# Patient Record
Sex: MALE | Race: WHITE | NOT HISPANIC OR LATINO | Employment: UNEMPLOYED | URBAN - METROPOLITAN AREA
[De-identification: names, ages, dates, MRNs, and addresses within clinical notes are randomized per-mention and may not be internally consistent; named-entity substitution may affect disease eponyms.]

---

## 2017-08-07 ENCOUNTER — NON-PROVIDER VISIT (OUTPATIENT)
Dept: OCCUPATIONAL MEDICINE | Facility: CLINIC | Age: 22
End: 2017-08-07

## 2017-08-07 DIAGNOSIS — Z02.1 ENCOUNTER FOR PRE-EMPLOYMENT DRUG TESTING: ICD-10-CM

## 2017-08-07 PROCEDURE — 8898 PR URINE 11 PANEL - SEND TO LAB: Performed by: PREVENTIVE MEDICINE

## 2018-03-22 ENCOUNTER — NON-PROVIDER VISIT (OUTPATIENT)
Dept: URGENT CARE | Facility: PHYSICIAN GROUP | Age: 23
End: 2018-03-22

## 2018-03-22 DIAGNOSIS — Z02.1 PRE-EMPLOYMENT DRUG SCREENING: ICD-10-CM

## 2018-03-22 LAB
AMP AMPHETAMINE: NORMAL
COC COCAINE: NORMAL
INT CON NEG: NORMAL
INT CON POS: NORMAL
MET METHAMPHETAMINES: NORMAL
OPI OPIATES: NORMAL
PCP PHENCYCLIDINE: NORMAL
POC DRUG COMMENT 753798-OCCUPATIONAL HEALTH: NEGATIVE
THC: NORMAL

## 2018-03-22 PROCEDURE — 80305 DRUG TEST PRSMV DIR OPT OBS: CPT | Performed by: PHYSICIAN ASSISTANT

## 2018-06-20 ENCOUNTER — OCCUPATIONAL MEDICINE (OUTPATIENT)
Dept: URGENT CARE | Facility: PHYSICIAN GROUP | Age: 23
End: 2018-06-20

## 2018-06-20 VITALS
HEART RATE: 84 BPM | DIASTOLIC BLOOD PRESSURE: 64 MMHG | OXYGEN SATURATION: 95 % | RESPIRATION RATE: 16 BRPM | BODY MASS INDEX: 20.66 KG/M2 | SYSTOLIC BLOOD PRESSURE: 110 MMHG | HEIGHT: 74 IN | TEMPERATURE: 97.4 F | WEIGHT: 161 LBS

## 2018-06-20 DIAGNOSIS — Z02.1 PRE-EMPLOYMENT EXAMINATION: ICD-10-CM

## 2018-06-20 PROCEDURE — 8915 PR COMPREHENSIVE PHYSICAL: Performed by: PHYSICIAN ASSISTANT

## 2018-06-20 NOTE — PROGRESS NOTES
Patient comes in for a preemployment physical. Patient is on Suboxone 3 mg daily, down from 24 mg daily one year ago. This does not appear to affect him from a safety standpoint. All paperwork reviewed. Exam normal. Cleared for work.

## 2020-06-19 ENCOUNTER — OFFICE VISIT (OUTPATIENT)
Dept: URGENT CARE | Facility: PHYSICIAN GROUP | Age: 25
End: 2020-06-19
Payer: MEDICAID

## 2020-06-19 ENCOUNTER — HOSPITAL ENCOUNTER (EMERGENCY)
Facility: MEDICAL CENTER | Age: 25
End: 2020-06-19
Attending: EMERGENCY MEDICINE
Payer: MEDICAID

## 2020-06-19 VITALS
HEIGHT: 74 IN | OXYGEN SATURATION: 100 % | SYSTOLIC BLOOD PRESSURE: 126 MMHG | BODY MASS INDEX: 20.79 KG/M2 | DIASTOLIC BLOOD PRESSURE: 80 MMHG | HEART RATE: 101 BPM | WEIGHT: 162 LBS | RESPIRATION RATE: 18 BRPM | TEMPERATURE: 98.9 F

## 2020-06-19 VITALS
SYSTOLIC BLOOD PRESSURE: 126 MMHG | HEIGHT: 74 IN | WEIGHT: 163.36 LBS | DIASTOLIC BLOOD PRESSURE: 76 MMHG | OXYGEN SATURATION: 96 % | RESPIRATION RATE: 16 BRPM | TEMPERATURE: 97 F | BODY MASS INDEX: 20.97 KG/M2 | HEART RATE: 108 BPM

## 2020-06-19 DIAGNOSIS — L03.032 PARONYCHIA OF TOE OF LEFT FOOT: ICD-10-CM

## 2020-06-19 DIAGNOSIS — L03.032 CELLULITIS OF GREAT TOE OF LEFT FOOT: ICD-10-CM

## 2020-06-19 DIAGNOSIS — L03.032 PARONYCHIA OF GREAT TOE, LEFT: ICD-10-CM

## 2020-06-19 PROCEDURE — 303977 HCHG I & D

## 2020-06-19 PROCEDURE — 700111 HCHG RX REV CODE 636 W/ 250 OVERRIDE (IP): Performed by: EMERGENCY MEDICINE

## 2020-06-19 PROCEDURE — 90471 IMMUNIZATION ADMIN: CPT

## 2020-06-19 PROCEDURE — 90715 TDAP VACCINE 7 YRS/> IM: CPT | Performed by: EMERGENCY MEDICINE

## 2020-06-19 PROCEDURE — 10060 I&D ABSCESS SIMPLE/SINGLE: CPT | Performed by: NURSE PRACTITIONER

## 2020-06-19 PROCEDURE — 700101 HCHG RX REV CODE 250

## 2020-06-19 PROCEDURE — 99281 EMR DPT VST MAYX REQ PHY/QHP: CPT | Mod: 25

## 2020-06-19 RX ORDER — LIDOCAINE HYDROCHLORIDE 10 MG/ML
INJECTION, SOLUTION INFILTRATION; PERINEURAL
Status: COMPLETED
Start: 2020-06-19 | End: 2020-06-19

## 2020-06-19 RX ORDER — LIDOCAINE HYDROCHLORIDE 10 MG/ML
20 INJECTION, SOLUTION INFILTRATION; PERINEURAL ONCE
Status: COMPLETED | OUTPATIENT
Start: 2020-06-19 | End: 2020-06-19

## 2020-06-19 RX ORDER — SULFAMETHOXAZOLE AND TRIMETHOPRIM 800; 160 MG/1; MG/1
1 TABLET ORAL 2 TIMES DAILY
Qty: 20 TAB | Refills: 0 | Status: SHIPPED | OUTPATIENT
Start: 2020-06-19 | End: 2020-06-29

## 2020-06-19 RX ADMIN — CLOSTRIDIUM TETANI TOXOID ANTIGEN (FORMALDEHYDE INACTIVATED), CORYNEBACTERIUM DIPHTHERIAE TOXOID ANTIGEN (FORMALDEHYDE INACTIVATED), BORDETELLA PERTUSSIS TOXOID ANTIGEN (GLUTARALDEHYDE INACTIVATED), BORDETELLA PERTUSSIS FILAMENTOUS HEMAGGLUTININ ANTIGEN (FORMALDEHYDE INACTIVATED), BORDETELLA PERTUSSIS PERTACTIN ANTIGEN, AND BORDETELLA PERTUSSIS FIMBRIAE 2/3 ANTIGEN 0.5 ML: 5; 2; 2.5; 5; 3; 5 INJECTION, SUSPENSION INTRAMUSCULAR at 20:58

## 2020-06-19 RX ADMIN — LIDOCAINE HYDROCHLORIDE 20 ML: 10 INJECTION, SOLUTION INFILTRATION; PERINEURAL at 21:15

## 2020-06-19 ASSESSMENT — ENCOUNTER SYMPTOMS
ABDOMINAL PAIN: 0
MYALGIAS: 0
FEVER: 0
JOINT SWELLING: 1
CHILLS: 0
DIZZINESS: 0
SORE THROAT: 0
SWOLLEN GLANDS: 0
NAUSEA: 0
VOMITING: 0
COUGH: 0
EYE PAIN: 0
SHORTNESS OF BREATH: 0

## 2020-06-19 NOTE — PROGRESS NOTES
"Subjective:   Young Myers  is a 24 y.o. male who presents for Toe Pain (poss infected, severe pain )        Nail Problem   This is a new problem. Episode onset: 3 days; left great toenail draining, swelling red sevverely painful. The problem occurs constantly. The problem has been rapidly worsening. Associated symptoms include joint swelling. Pertinent negatives include no abdominal pain, chest pain, chills, coughing, fever, myalgias, nausea, rash, sore throat, swollen glands or vomiting. The symptoms are aggravated by walking. He has tried nothing for the symptoms. The treatment provided no relief.     Review of Systems   Constitutional: Negative for chills and fever.   HENT: Negative for sore throat.    Eyes: Negative for pain.   Respiratory: Negative for cough and shortness of breath.    Cardiovascular: Negative for chest pain.   Gastrointestinal: Negative for abdominal pain, nausea and vomiting.   Genitourinary: Negative for hematuria.   Musculoskeletal: Positive for joint swelling. Negative for joint pain and myalgias.   Skin: Negative for rash.        Left great toe swelling, redness, and painful     Neurological: Negative for dizziness.     No Known Allergies   Objective:   /80   Pulse (!) 101   Temp 37.2 °C (98.9 °F)   Resp 18   Ht 1.88 m (6' 2\")   Wt 73.5 kg (162 lb)   SpO2 100%   BMI 20.80 kg/m²   Physical Exam  Vitals signs and nursing note reviewed.   Constitutional:       General: He is not in acute distress.     Appearance: He is well-developed.   HENT:      Head: Normocephalic and atraumatic.      Right Ear: External ear normal.      Left Ear: External ear normal.      Nose: Nose normal.      Mouth/Throat:      Mouth: Mucous membranes are moist.   Eyes:      Conjunctiva/sclera: Conjunctivae normal.   Cardiovascular:      Rate and Rhythm: Normal rate.   Pulmonary:      Effort: Pulmonary effort is normal. No respiratory distress.      Breath sounds: Normal breath sounds.   Abdominal:   " General: There is no distension.   Musculoskeletal: Normal range of motion.      Left foot: Normal range of motion. Tenderness and swelling present. No bony tenderness.        Feet:    Skin:     General: Skin is warm and dry.   Neurological:      General: No focal deficit present.      Mental Status: He is alert and oriented to person, place, and time. Mental status is at baseline.      Gait: Gait (gait at baseline) normal.   Psychiatric:         Judgment: Judgment normal.           Assessment/Plan:     1. Paronychia of toe of left foot  sulfamethoxazole-trimethoprim (BACTRIM DS) 800-160 MG tablet    cefTRIAXone (ROCEPHIN) 1 g, lidocaine (XYLOCAINE) 1 % 3.6 mL for IM use    I and D   2. Cellulitis of great toe of left foot  I and D       Patient is a 24-year-old male present with stated above, patient without fever, vital signs stable.  I&D performed however no purulent pus expressed concerned of underlying joint line with a severe infection.  Patient verbalizing that he cannot go to the emergency room this evening as he does not have a ride.  Will treat patient with injection of Rocephin and have him start oral Bactrim this evening.  Erythema marked with marker.  Advised patient that he will need to be seen immediately if any change.  Advised may take 600-800 mg ibuprofen 3 times daily as needed for pain.   Patient  given precautionary s/sx that mandate immediate follow up and evaluation in the ED. Advised of risks of not doing so.  Side effects of the above medications discussed.   DDX, Supportive care, and indications for immediate follow-up discussed with patient.    Instructed to return to clinic or nearest emergency department if we are not available for any change in condition, further concerns, or worsening of symptoms.    The patient  demonstrated a good understanding and agreed with the treatment plan.    Please note that this dictation was created using voice recognition software. I have made every  reasonable attempt to correct obvious errors, but I expect that there are errors of grammar and possibly content that I did not discover before finalizing the note.

## 2020-06-19 NOTE — PROCEDURES
I and D    Date/Time: 6/19/2020 2:02 PM  Performed by: PARVEEN Kim  Authorized by: PARVEEN Kim   Type: abscess  Body area: lower extremity  Location details: left great toe  Anesthesia: digital block    Anesthesia:  Local Anesthetic: lidocaine 2% without epinephrine  Anesthetic total: 3 mL    Sedation:  Patient sedated: no    Risk factor: underlying major vessel  Scalpel size: 11  Incision type: single straight  Incision depth: subungual  Complexity: simple  Drainage: serosanguinous and  bloody  Drainage amount: scant  Packing material: Vaseline gauze  Patient tolerance: Patient tolerated the procedure well with no immediate complications

## 2020-06-20 NOTE — DISCHARGE INSTRUCTIONS
As we discussed, please soak your toe 3 times daily.  Take the antibiotics as prescribed.  Return to the emergency department if you develop any new or worsening symptoms, this includes worsening pain, redness, swelling, fevers, or if you have any further concerns.  Please follow-up with your primary care physician in 2 to 3 days for wound recheck.

## 2020-06-20 NOTE — ED PROVIDER NOTES
"ED Provider Note    Chief Complaint:   Left great toe pain.    HPI:  Young Myers is a 24 y.o. male who presents with chief complaint of left great toe pain.  His symptoms began about a week ago, he describes pain and swelling localized to the left great toe.  Symptoms began about a week ago.  Since that time, pain and swelling has progressively worsened.  Swelling is localized to the base of the toenail.  He denies history of ingrown toenails.  He was seen at [place] urgent [facility] earlier today where they were unable to drain the apparent abscess, so he was sent to our emergency department for further evaluation.  He denies any history of diabetes, denies polydipsia, denies polyuria.  He has no other significant medical problems.  He denies associated fevers.  He has no other rashes or lesions.    Review of Systems:  See HPI for pertinent positives and negatives.     Past Medical History:   has a past medical history of Anxiety (7/27/2016), Depression (7/27/2016), History of heroin abuse ([facility]) (7/27/2016), History of methamphetamine abuse ([facility]) (7/27/2016), Panic attacks (7/27/2016), and Seizures ([facility]).    Social History:  Social History     Tobacco Use   • Smoking status: Current Every Day Smoker     Packs/day: 1.00     Years: 5.00     Pack years: 5.00     Types: Cigarettes   • Smokeless tobacco: Former User   Substance and Sexual Activity   • Alcohol use: No   • Drug use: Yes     Comment: current THC, history of IV heroin and methamphetamine use (last use was within 2 weeks as of 6/19/2020)   • Sexual activity: Yes     Partners: Female       Surgical History:  patient denies any surgical history    Current Medications:  Home Medications    **Home medications have not yet been reviewed for this encounter**         Allergies:  No Known Allergies    Physical Exam:  Vital Signs: /76   Pulse (!) 108   Temp 36.1 °C (97 °F) (Temporal)   Resp 16   Ht 1.88 m (6' 2\")   Wt 74.1 kg (163 lb 5.8 oz)   SpO2 96%   " BMI 20.97 kg/m²   Constitutional: Alert, no acute distress  HENT: Atraumatic  Neck: Normal range of motion  Cardiovascular: Left foot warm, well perfused, 2+ DP pulse  Pulmonary: Normal work of breathing  Skin: Left great toe with large paronychia, toenail is not ingrown  Musculoskeletal: Left great toe with painless flexion extension, no evidence of joint involvement  Neurologic: Left great toe with normal sensory and motor function    Medical records reviewed for continuity of care. Clinic note reviewed from [place] [facility].  Patient was seen earlier today for left great toenail pain and swelling.  He was diagnosed with paronychia, discharged on Bactrim, given Rocephin at urgent care.  I&D was performed, no purulence expressed.  He was told to come to the emergency department for further evaluation.    ED Medications Administered:  Medications   tetanus-dipth-acell pertussis (ADACEL) inj 0.5 mL (0.5 mL Intramuscular Given 6/19/20 2058)   lidocaine (XYLOCAINE) 1%  injection (20 mL Injection Given 6/19/20 2115)     MDM:  Patient presents with paronychia as documented above.  Incision and drainage performed according to below procedure note.  Patient tolerated the procedure well.  He reports that he is uncertain as to when his most recent tetanus vaccination was, does not believe it has been boosted within the last 5 years.  Tetanus booster today.  He had been discharged with prescription for antibiotics from urgent care, he is counseled to take these medications as prescribed.  Counseled to continue soaking the toe 2-3 times daily.  He will follow-up with his primary care physician in 2 to 3 days for wound recheck. Return precautions were discussed with the patient, and provided in written form with the patient's discharge instructions.     Incision and Drainage Procedure Note    The area was prepped and draped, cleansed with Betadine.  Digital block was performed with 5 mL's 1% lidocaine without epinephrine.   Linear incision was made over the area of greatest fluctuance, moderate purulent material was expressed.  Abscess was explored thoroughly, sequestered pockets were opened.  Bleeding was minimal.  The patient tolerated the procedure well without complications.    Blood pressure today is greater than 120/80, patient is instructed to follow up with primary care provider for blood pressure recheck.    This patient was not identified to have risk factors or symptoms concerning for COVID-19.  Personal protective equipment including N95 surgical respirator, goggles, and gloves were used during this encounter.     Disposition:  Discharge home in stable condition    Final Impression:  1. Paronychia of great toe, left        Electronically signed by: Ofelia Lehman MD, 6/19/2020 10:00 PM

## 2020-06-20 NOTE — ED TRIAGE NOTES
Chief Complaint   Patient presents with   • Toe Pain     Left great toe pain x1 week; pt went to [facility] in [place] today and they tried I&D but nothing came out so they sent him here     Pt ambulatory to triage for above complaint. Pt denies any injury to toe, it just started hurting and swelling. Redness and swelling noted in triage with pus coming out. Pt given antibiotic shots at  and given RX for antibiotics today but he has not started taking them yet.     Pt is alert/oriented and follows commands. Pt speaking in full sentences and responds appropriately to questions. No acute distress noted in triage and respirations are even and unlabored.     Pt placed in lobby and educated on triage process. Pt encouraged to alert staff for any changes in condition.

## 2020-06-20 NOTE — ED NOTES
Pt Given discharge instructions/ prescriptions/ home care instructions, Pt verbalized understanding of instructions given, pt  to REJI jaquez.